# Patient Record
Sex: FEMALE | Race: BLACK OR AFRICAN AMERICAN | NOT HISPANIC OR LATINO | URBAN - METROPOLITAN AREA
[De-identification: names, ages, dates, MRNs, and addresses within clinical notes are randomized per-mention and may not be internally consistent; named-entity substitution may affect disease eponyms.]

---

## 2018-07-23 ENCOUNTER — EMERGENCY (EMERGENCY)
Facility: HOSPITAL | Age: 28
LOS: 1 days | Discharge: HOME | End: 2018-07-23
Attending: EMERGENCY MEDICINE | Admitting: EMERGENCY MEDICINE
Payer: MEDICAID

## 2018-07-23 VITALS
HEART RATE: 74 BPM | DIASTOLIC BLOOD PRESSURE: 75 MMHG | OXYGEN SATURATION: 98 % | RESPIRATION RATE: 18 BRPM | TEMPERATURE: 99 F | SYSTOLIC BLOOD PRESSURE: 117 MMHG

## 2018-07-23 VITALS
SYSTOLIC BLOOD PRESSURE: 114 MMHG | HEART RATE: 97 BPM | TEMPERATURE: 98 F | DIASTOLIC BLOOD PRESSURE: 80 MMHG | OXYGEN SATURATION: 98 % | RESPIRATION RATE: 18 BRPM | WEIGHT: 130.95 LBS

## 2018-07-23 DIAGNOSIS — R10.9 UNSPECIFIED ABDOMINAL PAIN: ICD-10-CM

## 2018-07-23 DIAGNOSIS — R63.0 ANOREXIA: ICD-10-CM

## 2018-07-23 DIAGNOSIS — R11.0 NAUSEA: ICD-10-CM

## 2018-07-23 LAB
ALBUMIN SERPL ELPH-MCNC: 4.1 G/DL — SIGNIFICANT CHANGE UP (ref 3.3–5)
ALP SERPL-CCNC: 85 U/L — SIGNIFICANT CHANGE UP (ref 40–120)
ALT FLD-CCNC: 13 U/L — SIGNIFICANT CHANGE UP (ref 10–45)
ANION GAP SERPL CALC-SCNC: 13 MMOL/L — SIGNIFICANT CHANGE UP (ref 5–17)
APPEARANCE UR: CLEAR — SIGNIFICANT CHANGE UP
AST SERPL-CCNC: 17 U/L — SIGNIFICANT CHANGE UP (ref 10–40)
BASOPHILS NFR BLD AUTO: 0.2 % — SIGNIFICANT CHANGE UP (ref 0–2)
BILIRUB SERPL-MCNC: 0.3 MG/DL — SIGNIFICANT CHANGE UP (ref 0.2–1.2)
BILIRUB UR-MCNC: NEGATIVE — SIGNIFICANT CHANGE UP
BUN SERPL-MCNC: 9 MG/DL — SIGNIFICANT CHANGE UP (ref 7–23)
CALCIUM SERPL-MCNC: 9.2 MG/DL — SIGNIFICANT CHANGE UP (ref 8.4–10.5)
CHLORIDE SERPL-SCNC: 100 MMOL/L — SIGNIFICANT CHANGE UP (ref 96–108)
CO2 SERPL-SCNC: 26 MMOL/L — SIGNIFICANT CHANGE UP (ref 22–31)
COLOR SPEC: YELLOW — SIGNIFICANT CHANGE UP
CREAT SERPL-MCNC: 0.55 MG/DL — SIGNIFICANT CHANGE UP (ref 0.5–1.3)
DIFF PNL FLD: NEGATIVE — SIGNIFICANT CHANGE UP
EOSINOPHIL NFR BLD AUTO: 0.7 % — SIGNIFICANT CHANGE UP (ref 0–6)
GLUCOSE SERPL-MCNC: 107 MG/DL — HIGH (ref 70–99)
GLUCOSE UR QL: NEGATIVE — SIGNIFICANT CHANGE UP
HCT VFR BLD CALC: 35.8 % — SIGNIFICANT CHANGE UP (ref 34.5–45)
HGB BLD-MCNC: 11.8 G/DL — SIGNIFICANT CHANGE UP (ref 11.5–15.5)
KETONES UR-MCNC: NEGATIVE — SIGNIFICANT CHANGE UP
LEUKOCYTE ESTERASE UR-ACNC: NEGATIVE — SIGNIFICANT CHANGE UP
LIDOCAIN IGE QN: 39 U/L — SIGNIFICANT CHANGE UP (ref 7–60)
LYMPHOCYTES # BLD AUTO: 22.7 % — SIGNIFICANT CHANGE UP (ref 13–44)
MCHC RBC-ENTMCNC: 29.4 PG — SIGNIFICANT CHANGE UP (ref 27–34)
MCHC RBC-ENTMCNC: 33 G/DL — SIGNIFICANT CHANGE UP (ref 32–36)
MCV RBC AUTO: 89.1 FL — SIGNIFICANT CHANGE UP (ref 80–100)
MONOCYTES NFR BLD AUTO: 6.2 % — SIGNIFICANT CHANGE UP (ref 2–14)
NEUTROPHILS NFR BLD AUTO: 70.2 % — SIGNIFICANT CHANGE UP (ref 43–77)
NITRITE UR-MCNC: NEGATIVE — SIGNIFICANT CHANGE UP
PH UR: 6.5 — SIGNIFICANT CHANGE UP (ref 5–8)
PLATELET # BLD AUTO: 321 K/UL — SIGNIFICANT CHANGE UP (ref 150–400)
POTASSIUM SERPL-MCNC: 3.6 MMOL/L — SIGNIFICANT CHANGE UP (ref 3.5–5.3)
POTASSIUM SERPL-SCNC: 3.6 MMOL/L — SIGNIFICANT CHANGE UP (ref 3.5–5.3)
PROT SERPL-MCNC: 7.3 G/DL — SIGNIFICANT CHANGE UP (ref 6–8.3)
PROT UR-MCNC: NEGATIVE MG/DL — SIGNIFICANT CHANGE UP
RBC # BLD: 4.02 M/UL — SIGNIFICANT CHANGE UP (ref 3.8–5.2)
RBC # FLD: 13.3 % — SIGNIFICANT CHANGE UP (ref 10.3–16.9)
SODIUM SERPL-SCNC: 139 MMOL/L — SIGNIFICANT CHANGE UP (ref 135–145)
SP GR SPEC: <=1.005 — SIGNIFICANT CHANGE UP (ref 1–1.03)
UROBILINOGEN FLD QL: 0.2 E.U./DL — SIGNIFICANT CHANGE UP
WBC # BLD: 12.2 K/UL — HIGH (ref 3.8–10.5)
WBC # FLD AUTO: 12.2 K/UL — HIGH (ref 3.8–10.5)

## 2018-07-23 PROCEDURE — 96374 THER/PROPH/DIAG INJ IV PUSH: CPT

## 2018-07-23 PROCEDURE — 99284 EMERGENCY DEPT VISIT MOD MDM: CPT

## 2018-07-23 PROCEDURE — 81003 URINALYSIS AUTO W/O SCOPE: CPT

## 2018-07-23 PROCEDURE — 83690 ASSAY OF LIPASE: CPT

## 2018-07-23 PROCEDURE — 36415 COLL VENOUS BLD VENIPUNCTURE: CPT

## 2018-07-23 PROCEDURE — 80053 COMPREHEN METABOLIC PANEL: CPT

## 2018-07-23 PROCEDURE — 85025 COMPLETE CBC W/AUTO DIFF WBC: CPT

## 2018-07-23 PROCEDURE — 99284 EMERGENCY DEPT VISIT MOD MDM: CPT | Mod: 25

## 2018-07-23 RX ORDER — SERTRALINE 25 MG/1
0 TABLET, FILM COATED ORAL
Qty: 0 | Refills: 0 | COMMUNITY

## 2018-07-23 RX ORDER — METOCLOPRAMIDE HCL 10 MG
10 TABLET ORAL ONCE
Qty: 0 | Refills: 0 | Status: COMPLETED | OUTPATIENT
Start: 2018-07-23 | End: 2018-07-23

## 2018-07-23 RX ORDER — QUETIAPINE FUMARATE 200 MG/1
0 TABLET, FILM COATED ORAL
Qty: 0 | Refills: 0 | COMMUNITY

## 2018-07-23 RX ORDER — METOCLOPRAMIDE HCL 10 MG
1 TABLET ORAL
Qty: 6 | Refills: 0 | OUTPATIENT
Start: 2018-07-23

## 2018-07-23 RX ADMIN — Medication 10 MILLIGRAM(S): at 22:35

## 2018-07-23 NOTE — ED PROVIDER NOTE - OBJECTIVE STATEMENT
26 y/o f presents c/o nausea for the past 2 months, has vomited a few times as well.  Pt also reporting occasional mid abdominal pain although none today.  Pt stating has been seen at another ED, had labs and imaging which was normal.  Pt denies fever, chills, CP, SOB, dysuria, flank pain, all other ROS negative.

## 2018-07-23 NOTE — ED ADULT NURSE REASSESSMENT NOTE - NS ED NURSE REASSESS COMMENT FT1
Patient symptoms improved, no abdominal pain or nausea/vomitting complaint s/p meds, fluids PO tolerated well, all tests resulted.  Vital signs stable.  Discharged to home in stable condition.

## 2018-07-23 NOTE — ED ADULT NURSE REASSESSMENT NOTE - NS ED NURSE REASSESS COMMENT FT1
CT scan pending, patient states abdominal pain and nausea decreased and tolerable s/p meds.  Vital signs stable.  NPO observed.  NSS 1 L bolus completed.

## 2018-07-23 NOTE — ED PROVIDER NOTE - ATTENDING CONTRIBUTION TO CARE
27F with 27F with nausea, decreased appetite, no vomiting, no pain currently, has occ mid abd pain. Pt has been to different ER and had CT scan of abd pelvis that was negative. Pt has nontender abdomen, Plan for IV fluids, reglan, pt to f/u with GI MD as outpt. Will dc home. No indication for repeat scan with nontender abd.

## 2018-07-23 NOTE — ED ADULT NURSE REASSESSMENT NOTE - NS ED NURSE REASSESS COMMENT FT1
Patient a/ox3, anxious, c/o of RLQ abdominal intemittent pain, w/ nausea, no vomitting since arrival to ED. Vital signs stble.  PIV in place, all labs sent.  Reglan IVP administered.  Upreg negative.  Results pending. NPO observed.

## 2018-07-23 NOTE — ED ADULT NURSE NOTE - OBJECTIVE STATEMENT
Patient c/o of 2 months intermittent nausea, states yesterday had intermittent vomitting and abdominal pain on RLQ , no dysuria, no diarrhea, w/ subjective fevers,w/ decreased appetite.  States seen at another hospital and prescribed Zofran but still w/ some nausea.

## 2018-07-23 NOTE — ED PROVIDER NOTE - MEDICAL DECISION MAKING DETAILS
28 y/o f nausea x 2 months; nontender abd, labs wnl, pt tolerating PO, discussed need for outpatient GI f/u

## 2018-07-23 NOTE — ED ADULT NURSE NOTE - CHPI ED SYMPTOMS NEG
no dysuria/no hematuria/no abdominal distension/no diarrhea/no fever/no burning urination/no chills/no blood in stool

## 2020-07-27 ENCOUNTER — EMERGENCY (EMERGENCY)
Facility: HOSPITAL | Age: 30
LOS: 1 days | Discharge: HOME | End: 2020-07-27
Attending: EMERGENCY MEDICINE | Admitting: EMERGENCY MEDICINE
Payer: MEDICAID

## 2020-07-27 VITALS
SYSTOLIC BLOOD PRESSURE: 135 MMHG | WEIGHT: 123.46 LBS | HEART RATE: 64 BPM | TEMPERATURE: 99 F | RESPIRATION RATE: 17 BRPM | HEIGHT: 65 IN | OXYGEN SATURATION: 98 % | DIASTOLIC BLOOD PRESSURE: 84 MMHG

## 2020-07-27 PROCEDURE — 99284 EMERGENCY DEPT VISIT MOD MDM: CPT

## 2020-07-27 RX ORDER — IBUPROFEN 200 MG
1 TABLET ORAL
Qty: 15 | Refills: 0
Start: 2020-07-27 | End: 2020-07-31

## 2020-07-27 RX ORDER — PSEUDOEPHEDRINE HCL 30 MG
1 TABLET ORAL
Qty: 15 | Refills: 0
Start: 2020-07-27 | End: 2020-07-31

## 2020-07-27 RX ORDER — IBUPROFEN 200 MG
600 TABLET ORAL ONCE
Refills: 0 | Status: COMPLETED | OUTPATIENT
Start: 2020-07-27 | End: 2020-07-27

## 2020-07-27 RX ADMIN — Medication 1 TABLET(S): at 00:59

## 2020-07-27 RX ADMIN — Medication 600 MILLIGRAM(S): at 00:59

## 2020-07-27 NOTE — ED PROVIDER NOTE - CLINICAL SUMMARY MEDICAL DECISION MAKING FREE TEXT BOX
30 y/o female with nasal congestion, b/l maxillary pain/pressure that radiates to b/l ear with fatigue. Consider allergies vs sinusitis vs viral uri vs strep. Pt well-appearing and non-toxic in NAD. Posterior pharynx normal. Do not suspect mono/strep. Advised to dc use of nasal decongestants to avoid rhinitis medicamentosa. I have discussed the discharge plan with the patient. The patient agrees with the plan, as discussed.  The patient understands Emergency Department diagnosis is a preliminary diagnosis often based on limited information and that the patient must adhere to the follow-up plan as discussed.  The patient understands that if the symptoms worsen or if prescribed medications do not have the desired/planned effect that the patient may return to the Emergency Department at any time for further evaluation and treatment.

## 2020-07-27 NOTE — ED PROVIDER NOTE - ATTENDING CONTRIBUTION TO CARE
29F sinusitis c/o 2w rhinorrhea/congestion and bl maxillary pressure radiating to ears unrelieved s/p otc antihistamines. +fatigue. no fever/chills, no dizziness, no neck pain/stiffness, no photophobia/vision changes, no uri/cough, no cp/sob, no abd pain/n/v, no sick contacts, no prior covid, no trauma, no sinus surgeries, no recent abx. avss. nontoxic. NAD. no systemic sx. no acute focal neuro deficits. +sinus tenderness to percussion. no nasal polyp. s/p augmentin/ibuprofen. no indication for labs/imaging at this time. will dc w/ outpatient pcp fu, augmentin/pseudophed/ibuprofen, strict return precautions. pt agrees w/ plan. questions answered.     I saw and discussed the care of the pt directly with the ACP while the pt was in the ED. i have reviewed the ACP note and agree w/ the history, exam and plan of care other than as noted above.

## 2020-07-27 NOTE — ED ADULT NURSE NOTE - OBJECTIVE STATEMENT
pt to ED for nasal congestion, fatigue accompanied pressure like pain in headache, sinus area, jaw for 3days. denies n/v/d, sob, cp, fever/chills/cough. pt denies chance of pregnancy.

## 2020-07-27 NOTE — ED ADULT NURSE NOTE - NSIMPLEMENTINTERV_GEN_ALL_ED
Implemented All Universal Safety Interventions:  [place] to call system. Call bell, personal items and telephone within reach. Instruct patient to call for assistance. Room bathroom lighting operational. Non-slip footwear when patient is off stretcher. Physically safe environment: no spills, clutter or unnecessary equipment. Stretcher in lowest position, wheels locked, appropriate side rails in place.

## 2020-07-27 NOTE — ED PROVIDER NOTE - PATIENT PORTAL LINK FT
You can access the FollowMyHealth Patient Portal offered by [facility] by registering at the following website: [email or web address]. By joining [employer]’s FollowMyHealth portal, you will also be able to view your health information using other applications (apps) compatible with our system.

## 2020-07-27 NOTE — ED PROVIDER NOTE - NSFOLLOWUPINSTRUCTIONS_ED_ALL_ED_FT
Please discontinue use of Flonase to avoid rebound nasal congestion. Please take augmentin twice a day for 5 days and follow up with ENT. Return to the Emergency Department if you have any new or worsening symptoms, or if you have any concerns.    Please reach out to Danyelle Robledo ([facility] ED clinical referral coordinator) to assist you with your follow-up appointment.     Monday - Friday 11am-7pm  [phone]  [email or web address]    Sinusitis, Adult  Sinusitis is inflammation of your sinuses. Sinuses are hollow spaces in the bones around your face. Your sinuses are located:  Around your eyes.In the middle of your forehead.Behind your nose.In your cheekbones.Mucus normally drains out of your sinuses. When your nasal tissues become inflamed or swollen, mucus can become trapped or blocked. This allows bacteria, viruses, and fungi to grow, which leads to infection. Most infections of the sinuses are caused by a virus.  Sinusitis can develop quickly. It can last for up to 4 weeks (acute) or for more than 12 weeks (chronic). Sinusitis often develops after a cold.  What are the causes?  This condition is caused by anything that creates swelling in the sinuses or stops mucus from draining. This includes:  Allergies.Asthma.Infection from bacteria or viruses.Deformities or blockages in your nose or sinuses.Abnormal growths in the nose (nasal polyps).Pollutants, such as chemicals or irritants in the air.Infection from fungi (rare).What increases the risk?  You are more likely to develop this condition if you:  Have a weak body defense system (immune system).Do a lot of swimming or diving.Overuse nasal sprays.Smoke.What are the signs or symptoms?  The main symptoms of this condition are pain and a feeling of pressure around the affected sinuses. Other symptoms include:  Stuffy nose or congestion.Thick drainage from your nose.Swelling and warmth over the affected sinuses.Headache.Upper toothache.A cough that may get worse at night.Extra mucus that collects in the throat or the back of the nose (postnasal drip).Decreased sense of smell and taste.Fatigue.A fever.Sore throat.Bad breath.How is this diagnosed?  This condition is diagnosed based on:  Your symptoms.Your medical history.A physical exam.Tests to find out if your condition is acute or chronic. This may include:  Checking your nose for nasal polyps.Viewing your sinuses using a device that has a light (endoscope).Testing for allergies or bacteria.Imaging tests, such as an MRI or CT scan.In rare cases, a bone biopsy may be done to rule out more serious types of fungal sinus disease.  How is this treated?  Treatment for sinusitis depends on the cause and whether your condition is chronic or acute.  If caused by a virus, your symptoms should go away on their own within 10 days. You may be given medicines to relieve symptoms. They include:  Medicines that shrink swollen nasal passages (topical intranasal decongestants). Medicines that treat allergies (antihistamines).A spray that eases inflammation of the nostrils (topical intranasal corticosteroids).Rinses that help get rid of thick mucus in your nose (nasal saline washes).If caused by bacteria, your health care provider may recommend waiting to see if your symptoms improve. Most bacterial infections will get better without antibiotic medicine. You may be given antibiotics if you have:  A severe infection. A weak immune system.If caused by narrow nasal passages or nasal polyps, you may need to have surgery.Follow these instructions at home:  Medicines     Take, use, or apply over-the-counter and prescription medicines only as told by your health care provider. These may include nasal sprays.If you were prescribed an antibiotic medicine, take it as told by your health care provider. Do not stop taking the antibiotic even if you start to feel better.Hydrate and humidify        Drink enough fluid to keep your urine pale yellow. Staying hydrated will help to thin your mucus.Use a cool mist humidifier to keep the humidity level in your home above 50%.Inhale steam for 10–15 minutes, 3–4 times a day, or as told by your health care provider. You can do this in the bathroom while a hot shower is running.Limit your exposure to cool or dry air.Rest     Rest as much as possible.Sleep with your head raised (elevated).Make sure you get enough sleep each night.General instructions        Apply a warm, moist washcloth to your face 3–4 times a day or as told by your health care provider. This will help with discomfort.Wash your hands often with soap and water to reduce your exposure to germs. If soap and water are not available, use hand .Do not smoke. Avoid being around people who are smoking (secondhand smoke).Keep all follow-up visits as told by your health care provider. This is important.Contact a health care provider if:  You have a fever.Your symptoms get worse.Your symptoms do not improve within 10 days.Get help right away if:  You have a severe headache.You have persistent vomiting.You have severe pain or swelling around your face or eyes.You have vision problems.You develop confusion.Your neck is stiff.You have trouble breathing.Summary  Sinusitis is soreness and inflammation of your sinuses. Sinuses are hollow spaces in the bones around your face.This condition is caused by nasal tissues that become inflamed or swollen. The swelling traps or blocks the flow of mucus. This allows bacteria, viruses, and fungi to grow, which leads to infection.If you were prescribed an antibiotic medicine, take it as told by your health care provider. Do not stop taking the antibiotic even if you start to feel better.Keep all follow-up visits as told by your health care provider. This is important.This information is not intended to replace advice given to you by your health care provider. Make sure you discuss any questions you have with your health care provider.    Document Released: 12/18/2006 Document Revised: 05/20/2019 Document Reviewed: 05/20/2019  [employer] Patient Education © 2020 [employer]

## 2020-07-27 NOTE — ED PROVIDER NOTE - OBJECTIVE STATEMENT
28 y/o female with a PMHx of sinusitis is present in the ER c/o b/l nasal congestion x2 weeks. Her symptoms include rhinorrhea, congestion, b/l maxillary discomfort which she describes as pressure that radiatae to her ears b/l with HA and fatigue. She has been using otc remedy without improvement of her symptoms. She denies the following: fever, chills, blurred vision, nausea/vomiting, sore throat, rash, sick contacts, anosmia.

## 2020-07-31 DIAGNOSIS — R53.83 OTHER FATIGUE: ICD-10-CM

## 2020-07-31 DIAGNOSIS — R09.81 NASAL CONGESTION: ICD-10-CM

## 2020-07-31 DIAGNOSIS — J01.90 ACUTE SINUSITIS, UNSPECIFIED: ICD-10-CM

## 2022-04-20 NOTE — ED PROVIDER NOTE - DISPOSITION TYPE
DISCHARGE impaired balance/decreased strength vision deficits leading to safety concerns for patient/impaired balance/cognition/decreased strength